# Patient Record
Sex: FEMALE | Race: OTHER | HISPANIC OR LATINO | URBAN - METROPOLITAN AREA
[De-identification: names, ages, dates, MRNs, and addresses within clinical notes are randomized per-mention and may not be internally consistent; named-entity substitution may affect disease eponyms.]

---

## 2017-03-27 ENCOUNTER — OFFICE (OUTPATIENT)
Dept: URBAN - METROPOLITAN AREA CLINIC 101 | Facility: CLINIC | Age: 52
End: 2017-03-27
Payer: COMMERCIAL

## 2017-03-27 VITALS
HEIGHT: 64 IN | SYSTOLIC BLOOD PRESSURE: 122 MMHG | DIASTOLIC BLOOD PRESSURE: 77 MMHG | TEMPERATURE: 97.7 F | HEART RATE: 83 BPM | WEIGHT: 219 LBS

## 2017-03-27 DIAGNOSIS — G47.30 SLEEP APNEA, UNSPECIFIED: ICD-10-CM

## 2017-03-27 DIAGNOSIS — I42.9 CARDIOMYOPATHY, UNSPECIFIED: ICD-10-CM

## 2017-03-27 DIAGNOSIS — Z12.11 ENCOUNTER FOR SCREENING FOR MALIGNANT NEOPLASM OF COLON: ICD-10-CM

## 2017-03-27 DIAGNOSIS — R10.13 EPIGASTRIC PAIN: ICD-10-CM

## 2017-03-27 DIAGNOSIS — K31.84 GASTROPARESIS: ICD-10-CM

## 2017-03-27 PROCEDURE — 99244 OFF/OP CNSLTJ NEW/EST MOD 40: CPT

## 2017-03-27 NOTE — SERVICEHPINOTES
AMAYA ALMODOVAR   is a   52   year old    female who is being seen in consultation at the request of   BHARATHI CHEN   for  gastroparesis, GERD, and colonoscopy. She reports having a GI emptying scan and being diagnosed with gastroparesis at [facility] in 2005. Last week she had food poisoning or gastroenteritis was experiencing a burning sensation in stomach  and n/v/d, symptoms lasted about 24 hours. She has GERD on occasion. Takes Nexium OTC prn and ranitidine 300mg daily with no noticeable improvement in GERD. The last week reports constant burning sensation in stomach. She has a BM 3-4 times a day. Stools are BSS type 2-3. No blood or mucous present. She was on Metformin for DM, but that caused diarrhea and throat swelling and therefore it was stopped.  No family history of colon cancer. Her last colonoscopy was in 2005 at [place] reports it being normal. She had an EGD in 2005 at [place] with findings of retained food/vitamins. When she eats she feels like her food just sits there and takes a long time to digest.  Normally doesn't have any issues with n/v. Her weight usually fluctuates within 5-7# a week. She has multiple allergies to medications. She also reports having allergies to seafood, nuts, and raw fruits. She has sleep apnea, uses CPAP machine. She follows with Dr. Chen cardiology every 4-6 months. She has cardiomypathy. She also has asthma uses albuterol prn and seravent and flovet 250mg. Notes shortness of breath with walking. She is going to have an echocardiogram April 6.BETI Healy is going to have surgery soon for a torn meniscus.

## 2017-04-18 ENCOUNTER — ON CAMPUS - OUTPATIENT (OUTPATIENT)
Dept: URBAN - METROPOLITAN AREA HOSPITAL 35 | Facility: HOSPITAL | Age: 52
End: 2017-04-18
Payer: COMMERCIAL

## 2017-04-18 DIAGNOSIS — Z12.11 ENCOUNTER FOR SCREENING FOR MALIGNANT NEOPLASM OF COLON: ICD-10-CM

## 2017-04-18 DIAGNOSIS — K31.84 GASTROPARESIS: ICD-10-CM

## 2017-04-18 DIAGNOSIS — K20.8 OTHER ESOPHAGITIS: ICD-10-CM

## 2017-04-18 DIAGNOSIS — R10.13 EPIGASTRIC PAIN: ICD-10-CM

## 2017-04-18 PROCEDURE — G0121 COLON CA SCRN NOT HI RSK IND: HCPCS

## 2017-04-18 PROCEDURE — 43239 EGD BIOPSY SINGLE/MULTIPLE: CPT

## 2017-06-13 ENCOUNTER — OFFICE (OUTPATIENT)
Dept: URBAN - METROPOLITAN AREA CLINIC 33 | Facility: CLINIC | Age: 52
End: 2017-06-13
Payer: COMMERCIAL

## 2017-06-13 VITALS
HEART RATE: 92 BPM | HEIGHT: 64 IN | DIASTOLIC BLOOD PRESSURE: 73 MMHG | TEMPERATURE: 97.3 F | SYSTOLIC BLOOD PRESSURE: 118 MMHG | WEIGHT: 212 LBS

## 2017-06-13 DIAGNOSIS — K31.84 GASTROPARESIS: ICD-10-CM

## 2017-06-13 PROCEDURE — 99213 OFFICE O/P EST LOW 20 MIN: CPT

## 2017-07-03 ENCOUNTER — ON CAMPUS - OUTPATIENT (OUTPATIENT)
Dept: URBAN - METROPOLITAN AREA HOSPITAL 63 | Facility: HOSPITAL | Age: 52
End: 2017-07-03
Payer: COMMERCIAL

## 2017-07-03 DIAGNOSIS — R10.13 EPIGASTRIC PAIN: ICD-10-CM

## 2017-07-03 DIAGNOSIS — K31.84 GASTROPARESIS: ICD-10-CM

## 2017-07-03 PROCEDURE — 43236 UPPR GI SCOPE W/SUBMUC INJ: CPT

## 2017-10-02 ENCOUNTER — OFFICE (OUTPATIENT)
Dept: URBAN - METROPOLITAN AREA CLINIC 78 | Facility: CLINIC | Age: 52
End: 2017-10-02
Payer: COMMERCIAL

## 2017-10-02 VITALS
DIASTOLIC BLOOD PRESSURE: 95 MMHG | HEIGHT: 64 IN | HEART RATE: 72 BPM | SYSTOLIC BLOOD PRESSURE: 139 MMHG | TEMPERATURE: 98.4 F | WEIGHT: 210 LBS

## 2017-10-02 DIAGNOSIS — K31.84 GASTROPARESIS: ICD-10-CM

## 2017-10-02 DIAGNOSIS — K30 FUNCTIONAL DYSPEPSIA: ICD-10-CM

## 2017-10-02 PROCEDURE — 99214 OFFICE O/P EST MOD 30 MIN: CPT

## 2017-10-02 RX ORDER — OMEPRAZOLE 40 MG/1
CAPSULE, DELAYED RELEASE ORAL
Qty: 30 | Refills: 3 | Status: COMPLETED
Start: 2017-04-11 | End: 2017-10-02

## 2017-10-02 RX ORDER — DEXLANSOPRAZOLE 60 MG/1
60 CAPSULE, DELAYED RELEASE ORAL
Qty: 30 | Refills: 5 | Status: COMPLETED
Start: 2017-10-02 | End: 2020-06-15

## 2018-06-13 ENCOUNTER — OFFICE (OUTPATIENT)
Dept: URBAN - METROPOLITAN AREA CLINIC 78 | Facility: CLINIC | Age: 53
End: 2018-06-13

## 2018-06-13 PROCEDURE — 00014: CPT

## 2020-06-15 ENCOUNTER — OFFICE (OUTPATIENT)
Dept: URBAN - METROPOLITAN AREA TELEHEALTH 12 | Facility: TELEHEALTH | Age: 55
End: 2020-06-15
Payer: COMMERCIAL

## 2020-06-15 VITALS — HEIGHT: 64 IN | WEIGHT: 150 LBS

## 2020-06-15 DIAGNOSIS — K82.8 OTHER SPECIFIED DISEASES OF GALLBLADDER: ICD-10-CM

## 2020-06-15 DIAGNOSIS — R10.13 EPIGASTRIC PAIN: ICD-10-CM

## 2020-06-15 DIAGNOSIS — K31.84 GASTROPARESIS: ICD-10-CM

## 2020-06-15 PROCEDURE — 99203 OFFICE O/P NEW LOW 30 MIN: CPT | Mod: 95 | Performed by: INTERNAL MEDICINE

## 2020-06-15 NOTE — SERVICENOTES
Patient's visit was conducted through phone communication. Patient consented before the start of visit as to understanding of privacy concerns, possible technological failure, and their responsibility of carrying out instructions of plan.

## 2020-06-15 NOTE — SERVICEHPINOTES
PATIENT VERIFIED BY DATE OF BIRTH AND NAME. Patient has been consented for this telecommunication visit. 54 yo fm with issues with rich fatty foods. Pt has issues with pain and nausea as well. Pt states issues continue. Pt states she is having and did have gastric bypass in the past. Pt states mushy/soft foods cause less issues. Pt had gastric bypass a few years ago. Pt has lost 50 lbs. Did have ex lap with Dr. Lopez to see if any post op issues present and this was reportedly negative. Put on Remeron to help with gastric emptying. She has a fear of eating at times. Sx are sudden in onset and occur a few minutes after eating. Seen by Dr. Oconnell and he sent to our clinic, no other testing reportedly done.

## 2020-07-24 ENCOUNTER — OFFICE (OUTPATIENT)
Dept: URBAN - METROPOLITAN AREA TELEHEALTH 3 | Facility: TELEHEALTH | Age: 55
End: 2020-07-24

## 2020-07-24 VITALS — WEIGHT: 155 LBS | HEIGHT: 64 IN

## 2020-07-24 DIAGNOSIS — R10.13 EPIGASTRIC PAIN: ICD-10-CM

## 2020-07-24 PROCEDURE — 99213 OFFICE O/P EST LOW 20 MIN: CPT | Performed by: INTERNAL MEDICINE

## 2020-07-24 NOTE — SERVICEHPINOTES
PATIENT VERIFIED BY DATE OF BIRTH AND NAME. Patient has been consented for this telecommunication visit.    54 yo fm with fu for epigastric pain.  Pt states she has pain mostly after meals.  Pt states salad dressing causes the sx.  States more oily and fatty foods as well as baked goods make her worse.   ROS o/w negative.

## 2021-08-19 ENCOUNTER — OFFICE (OUTPATIENT)
Dept: URBAN - METROPOLITAN AREA TELEHEALTH 3 | Facility: TELEHEALTH | Age: 56
End: 2021-08-19

## 2021-08-19 VITALS — HEIGHT: 64 IN | WEIGHT: 145 LBS

## 2021-08-19 DIAGNOSIS — R19.4 CHANGE IN BOWEL HABIT: ICD-10-CM

## 2021-08-19 DIAGNOSIS — K59.09 OTHER CONSTIPATION: ICD-10-CM

## 2021-08-19 DIAGNOSIS — R10.13 EPIGASTRIC PAIN: ICD-10-CM

## 2021-08-19 PROCEDURE — 99214 OFFICE O/P EST MOD 30 MIN: CPT | Performed by: INTERNAL MEDICINE

## 2021-08-19 NOTE — SERVICEHPINOTES
PATIENT VERIFIED BY DATE OF BIRTH AND NAME. Patient has been consented for this telecommunication visit.   55 yo fm with follow up. Pt has issues with continued abdm pain. Pt still has issues with pain and sx that something is "stuck at times". Pt states she has to crush her meds due to her sx.  Pt takes NSAIDs, takes Percocet and other meds for her spine pain.  Pt takes Linzess as well and despite this still is constipated.  Pt can go one week without a BM.  Pt never had a princess.  Pt states food gets stuck in her stomach and sometimes has to have emesis with bile as well.  Wt is unchanged.  Denies blood in her stool.

## 2021-08-30 ENCOUNTER — OFFICE (OUTPATIENT)
Dept: URBAN - METROPOLITAN AREA CLINIC 102 | Facility: CLINIC | Age: 56
End: 2021-08-30

## 2021-08-30 PROCEDURE — 00038: CPT | Performed by: INTERNAL MEDICINE

## 2022-02-03 ENCOUNTER — ON CAMPUS - OUTPATIENT (OUTPATIENT)
Dept: URBAN - METROPOLITAN AREA HOSPITAL 16 | Facility: HOSPITAL | Age: 57
End: 2022-02-03

## 2022-02-03 DIAGNOSIS — K29.60 OTHER GASTRITIS WITHOUT BLEEDING: ICD-10-CM

## 2022-02-03 DIAGNOSIS — R10.13 EPIGASTRIC PAIN: ICD-10-CM

## 2022-02-03 DIAGNOSIS — Z98.84 BARIATRIC SURGERY STATUS: ICD-10-CM

## 2022-02-03 DIAGNOSIS — K59.09 OTHER CONSTIPATION: ICD-10-CM

## 2022-02-03 PROCEDURE — 43239 EGD BIOPSY SINGLE/MULTIPLE: CPT | Performed by: INTERNAL MEDICINE

## 2022-02-03 PROCEDURE — 45378 DIAGNOSTIC COLONOSCOPY: CPT | Performed by: INTERNAL MEDICINE

## 2023-05-30 ENCOUNTER — TELEHEALTH PROVIDED OTHER THAN IN PATIENT'S HOME (OUTPATIENT)
Dept: URBAN - METROPOLITAN AREA TELEHEALTH 12 | Facility: TELEHEALTH | Age: 58
End: 2023-05-30

## 2023-05-30 VITALS — WEIGHT: 155 LBS | HEIGHT: 64 IN

## 2023-05-30 DIAGNOSIS — R10.11 RIGHT UPPER QUADRANT PAIN: ICD-10-CM

## 2023-05-30 DIAGNOSIS — K59.09 OTHER CONSTIPATION: ICD-10-CM

## 2023-05-30 DIAGNOSIS — K31.84 GASTROPARESIS: ICD-10-CM

## 2023-05-30 DIAGNOSIS — R11.2 NAUSEA WITH VOMITING, UNSPECIFIED: ICD-10-CM

## 2023-05-30 PROCEDURE — 99214 OFFICE O/P EST MOD 30 MIN: CPT | Mod: 95 | Performed by: PHYSICIAN ASSISTANT

## 2023-05-30 NOTE — SERVICEHPINOTES
PATIENT VERIFIED BY DATE OF BIRTH AND NAME. Patient has been consented for this telecommunication visit.   Pt is here to discuss GI issues. She has a lot of chronic issues such as constipation, gastroparesis, etc. She has been increasing RUQ pain  after a CCY. She went to the ER one month ago for the pain. She had a normal CT scan, LFTs, and lipase. She tells me that Dr. Ramirez had to break her ribs to get to the gallbladder. Pain can wrap around to the back area. She spoke to Dr. Ramirez after the surgery who told her it could be her spine. She saw her spinal doctor who told her that it could be her thoracic spine. She also has a dx of arachnoiditis. Given two episodes of steroids which has helped her RUQ pain "quite a bit". In another month, she is having an MRI of the thoracic spine to check this area. Her right hip hurts too--so she wonders if it's all related. She now believes that her RUQ pain is not GI related and will follow with spinal doctor for this. br
sadie
She takes mirtazapine for gastroparesis. She takes Linzess for chronic constipation. If she eats, she gets sick immediately postprandially. This has been a chronic issue since she was a child. The Linzess doesn't always work for constipation. She is wondering what else can be done for gastroparesis. br
sadie
She takes chronic opioids for arachnoiditis. She has no other GI related complaints today. ROS as per HPI and otherwise is unremarkable.